# Patient Record
Sex: MALE | Race: BLACK OR AFRICAN AMERICAN | NOT HISPANIC OR LATINO | URBAN - METROPOLITAN AREA
[De-identification: names, ages, dates, MRNs, and addresses within clinical notes are randomized per-mention and may not be internally consistent; named-entity substitution may affect disease eponyms.]

---

## 2019-05-13 ENCOUNTER — EMERGENCY (EMERGENCY)
Facility: HOSPITAL | Age: 24
LOS: 1 days | Discharge: HOME | End: 2019-05-13
Attending: EMERGENCY MEDICINE
Payer: SELF-PAY

## 2019-05-13 VITALS
TEMPERATURE: 98 F | SYSTOLIC BLOOD PRESSURE: 127 MMHG | WEIGHT: 169.98 LBS | OXYGEN SATURATION: 99 % | RESPIRATION RATE: 16 BRPM | DIASTOLIC BLOOD PRESSURE: 82 MMHG | HEART RATE: 64 BPM

## 2019-05-13 VITALS
HEART RATE: 65 BPM | OXYGEN SATURATION: 99 % | RESPIRATION RATE: 16 BRPM | DIASTOLIC BLOOD PRESSURE: 78 MMHG | SYSTOLIC BLOOD PRESSURE: 122 MMHG | TEMPERATURE: 98 F

## 2019-05-13 DIAGNOSIS — Z98.890 OTHER SPECIFIED POSTPROCEDURAL STATES: Chronic | ICD-10-CM

## 2019-05-13 PROCEDURE — 99283 EMERGENCY DEPT VISIT LOW MDM: CPT

## 2019-05-13 PROCEDURE — 99284 EMERGENCY DEPT VISIT MOD MDM: CPT

## 2019-05-13 RX ORDER — CYCLOBENZAPRINE HYDROCHLORIDE 10 MG/1
1 TABLET, FILM COATED ORAL
Qty: 4 | Refills: 0
Start: 2019-05-13 | End: 2019-05-14

## 2019-05-13 RX ORDER — IBUPROFEN 200 MG
600 TABLET ORAL ONCE
Refills: 0 | Status: COMPLETED | OUTPATIENT
Start: 2019-05-13 | End: 2019-05-13

## 2019-05-13 RX ORDER — LIDOCAINE 4 G/100G
1 CREAM TOPICAL ONCE
Refills: 0 | Status: COMPLETED | OUTPATIENT
Start: 2019-05-13 | End: 2019-05-13

## 2019-05-13 RX ADMIN — Medication 600 MILLIGRAM(S): at 11:58

## 2019-05-13 RX ADMIN — LIDOCAINE 1 PATCH: 4 CREAM TOPICAL at 11:57

## 2019-05-13 NOTE — ED PROVIDER NOTE - PHYSICAL EXAMINATION
Head: atraumatic, normacephalic  Face: atraumatic, no crepitus no orbiral/maxillary/mandibular ttp  throat: uvula midline no exudates  eyes: perrla eomi  heart: rrr s1s2  lungs: ctab  abd: soft, nt nd +bs no rebound/guarding no cva ttp  skin: warm. No seatbelt sign, no bruising.   LE: no swelling, no calf ttp  back: paraspinal tenderness to c-spine. Paravertebral and trapezius left sided tenderness.   Neuro: CN II-XII grossly intact. finger to nose nl. Strength 5/5. Head: atraumatic, normacephalic  Face: atraumatic, no crepitus no orbiral/maxillary/mandibular ttp  throat: uvula midline no exudates  eyes: perrla eomi  heart: rrr s1s2  lungs: ctab  abd: soft, nt nd +bs no rebound/guarding no cva ttp  skin: warm. No seatbelt sign, no bruising.   LE: no swelling, no calf ttp  back: paraspinal tenderness to c-spine. Paravertebral and trapezius left sided tenderness. NO MIDLINE CERVICAL THORACIC OR LUMBAR TTP  Neuro: CN II-XII grossly intact. finger to nose nl. Strength 5/5.
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
## 2019-05-13 NOTE — ED PROVIDER NOTE - NS ED ROS FT
+neck pain, HA, tingling in neck    Denies f/c/n/v/cp/sob/palpitations/ cough/rash/headache/dizziness/abd.pain/d/c/dysuria/hematuria,· Negative Findings: no loss of consciousness, no N/V, no blurry vision, no numbness, no tingling in extremities, no SOB, no abd pain, no urinary sxs, no incontinence.

## 2019-05-13 NOTE — ED PROVIDER NOTE - NSFOLLOWUPINSTRUCTIONS_ED_ALL_ED_FT
- Follow up with your doctor in 1 week    - Some of your prescribed medications may make you drowsy, do NOT drive after taking    - You may be more sore tomorrow but return to the ED for new or worsening symptoms not controlled with ibuprofen/tylenol and prescribed medication for breakthrough pain.

## 2019-05-13 NOTE — ED ADULT NURSE NOTE - CHPI ED NUR SYMPTOMS NEG
no sleeping issues/no difficulty bearing weight/no disorientation/no dizziness/no laceration/no decreased eating/drinking/no acting out behaviors/no fussiness/no loss of consciousness/no crying/no back pain/no bruising
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
## 2019-05-13 NOTE — ED PROVIDER NOTE - CHPI ED SYMPTOMS NEG
no loss of consciousness/no N/V, no blurry vision, no numbness, no tingling in extremities, no SOB, no abd pain, no urinary sxs, no incontinence.
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
